# Patient Record
Sex: MALE | Race: WHITE | NOT HISPANIC OR LATINO | Employment: OTHER | URBAN - NONMETROPOLITAN AREA
[De-identification: names, ages, dates, MRNs, and addresses within clinical notes are randomized per-mention and may not be internally consistent; named-entity substitution may affect disease eponyms.]

---

## 2023-06-29 ENCOUNTER — OFFICE VISIT (OUTPATIENT)
Dept: URGENT CARE | Facility: CLINIC | Age: 88
End: 2023-06-29
Payer: MEDICARE

## 2023-06-29 DIAGNOSIS — M25.511 ACUTE PAIN OF RIGHT SHOULDER: ICD-10-CM

## 2023-06-29 DIAGNOSIS — S06.9X9A HEAD INJURY WITH LOSS OF CONSCIOUSNESS (HCC): Primary | ICD-10-CM

## 2023-06-29 DIAGNOSIS — S40.811A ABRASION OF RIGHT UPPER EXTREMITY, INITIAL ENCOUNTER: ICD-10-CM

## 2023-06-29 PROCEDURE — G0463 HOSPITAL OUTPT CLINIC VISIT: HCPCS

## 2023-06-29 PROCEDURE — 90715 TDAP VACCINE 7 YRS/> IM: CPT

## 2023-06-29 PROCEDURE — 90471 IMMUNIZATION ADMIN: CPT

## 2023-06-29 PROCEDURE — 99203 OFFICE O/P NEW LOW 30 MIN: CPT

## 2023-06-29 RX ORDER — FUROSEMIDE 20 MG/1
20 TABLET ORAL DAILY PRN
COMMUNITY
Start: 2023-03-27

## 2023-06-29 RX ORDER — AMLODIPINE BESYLATE 5 MG/1
5 TABLET ORAL DAILY
COMMUNITY
Start: 2023-03-27

## 2023-06-29 RX ORDER — TELMISARTAN 80 MG/1
160 TABLET ORAL DAILY
COMMUNITY

## 2023-06-29 RX ORDER — ALFUZOSIN HYDROCHLORIDE 10 MG/1
10 TABLET, EXTENDED RELEASE ORAL DAILY
COMMUNITY

## 2023-06-29 RX ORDER — SIMVASTATIN 20 MG
20 TABLET ORAL
COMMUNITY

## 2023-06-29 RX ORDER — ASPIRIN 325 MG
325 TABLET ORAL DAILY
COMMUNITY

## 2023-06-29 RX ORDER — PANTOPRAZOLE SODIUM 40 MG/1
40 TABLET, DELAYED RELEASE ORAL DAILY
COMMUNITY
Start: 2023-05-02

## 2023-06-29 RX ORDER — FINASTERIDE 5 MG/1
5 TABLET, FILM COATED ORAL DAILY
COMMUNITY
Start: 2023-06-19

## 2023-06-29 RX ORDER — METOPROLOL SUCCINATE 100 MG/1
100 TABLET, EXTENDED RELEASE ORAL DAILY
COMMUNITY

## 2023-06-29 RX ORDER — POTASSIUM CHLORIDE 600 MG/1
8 TABLET, FILM COATED, EXTENDED RELEASE ORAL 2 TIMES DAILY
COMMUNITY

## 2023-06-29 NOTE — PROGRESS NOTES
Skin tear on right upper arm dressed with non-adherent dressing advised to go to ER because of possible head injury

## 2023-06-29 NOTE — PROGRESS NOTES
[address]        NAME: Carson Solis is a 80 y o  male  : 1928    MRN: [id number]  DATE: 2023  TIME: 3:20 PM    Assessment and Plan   Head injury with loss of consciousness ([facility] [address] ) [[id number]]  1  Head injury with loss of consciousness ([facility] [address] )  Transfer to other facility    CANCELED: Transfer to other facility      2  Abrasion of right upper extremity, initial encounter  Tdap Vaccine greater than or equal to 8yo      3  Acute pain of right shoulder          PT does not appear to be in any acute distress at this time  PT appears to be at baseline per son's opinion  It is difficult at this time to assess his neuro exam secondary to past brain trauma about 34 years ago that 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 extremity weakness and vision changes on the left  Due to head injury 4-5 days ago with loss of consciousness, sending to the emergency room for further evaluation  Tetanus shot was administered in the clinic and the abrasion/skin tear on his right posterior upper arm was cleaned with dermal wound cleanser and bandaged  PT currently on aspirin, no other anticoagulation  Son and patient hesitant to go to the emergency room secondary to waiting extended periods of time to be seen at the emergency room in [facility]  15:19 -update, patient's son states that the patient's other son will be taking him to the hospital at [facility] at this time  Transfer order removed and resent to [facility]  Patient Instructions     Go to the emergency room as instructed  Chief Complaint     Chief Complaint   Patient presents with   • Donnamaria [place] on , R arm pain, wound on arm from fall  History of Present Illness       80year-old male here with son after falling backwards on Saturday or  (son and patient are unsure) and hitting the left/posterior head and having loss of consciousness  PT is unsure of how long he passed out for    Patient's son was not there and neither was the patient's wife  Initially son had no idea that he had loss of consciousness until today  Also has an abrasion/skin tear to the right posterior upper arm  Admits to some tingling/numbness in the right shoulder  PT able to move his arm in all directions without any pain  No deformities  He also has mild to moderate swelling of the right hand and distal wrist, PT has been wearing a tight compression wrap around his upper arm which seems to have caused the symptoms  PT has history of head injury after falling off a roof about 34 years ago per patient  Son states that it 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 as well as some left arm weakness and speech difficulties  Son states that he is normal at this time compared to his baseline  PT currently taking aspirin, denies any other anticoagulation  Denies any headache, nausea, vomiting, chest pain, trouble breathing  Review of Systems   Review of Systems   Constitutional: Negative  HENT: Negative  Eyes: Negative  Respiratory: Negative  Cardiovascular: Negative  Gastrointestinal: Negative  Musculoskeletal: Positive for arthralgias and joint swelling (right hand/wrist)  Skin: Positive for wound  Neurological: Positive for speech difficulty (chronic), weakness (chronic) and numbness (right shoulder)  Negative for dizziness, seizures and headaches           Current Medications       Current Outpatient Medications:   •  alfuzosin (UROXATRAL) 10 mg 24 hr tablet, Take 10 mg by mouth daily, Disp: , Rfl:   •  amLODIPine (NORVASC) 5 mg tablet, Take 5 mg by mouth daily, Disp: , Rfl:   •  aspirin 325 mg tablet, Take 325 mg by mouth daily, Disp: , Rfl:   •  finasteride (PROSCAR) 5 mg tablet, Take 5 mg by mouth daily, Disp: , Rfl:   •  furosemide (LASIX) 20 mg tablet, Take 20 mg by mouth daily as needed For edema, Disp: , Rfl:   •  metoprolol succinate (TOPROL-XL) 100 mg 24 hr tablet, 100 mg daily, Disp: , Rfl:   • pantoprazole (PROTONIX) 40 mg tablet, Take 40 mg by mouth daily, Disp: , Rfl:   •  potassium chloride (KLOR-CON) 8 MEQ tablet, Take 8 mEq by mouth 2 (two) times a day, Disp: , Rfl:   •  simvastatin (ZOCOR) 20 mg tablet, Take 20 mg by mouth, Disp: , Rfl:   •  telmisartan (MICARDIS) 80 MG tablet, 160 mg daily, Disp: , Rfl:     Current Allergies     Allergies as of 06/29/2023 - never reviewed   Allergen Reaction Noted   • Pollen extract Other (See Comments) 06/15/2021            The following portions of the patient's history were reviewed and updated as appropriate: allergies, current medications, past family history, past medical history, past social history, past surgical history and problem list      Past Medical History:   Diagnosis Date   • COPD (chronic obstructive pulmonary disease) ([facility] [address] )    • Hypertension    • Lipid disorder        Past Surgical History:   Procedure Laterality Date   • CORONARY ARTERY BYPASS GRAFT         No family history on file  Medications have been verified  Objective   There were no vitals taken for this visit  Physical Exam     Physical Exam  Constitutional:       General: He is not in acute distress  Appearance: Normal appearance  He is not ill-appearing or diaphoretic  HENT:      Head: Normocephalic  Comments: small contusion to the designated area below above  No open wounds  Eyes:      Pupils: Pupils are equal, round, and reactive to light  Cardiovascular:      Rate and Rhythm: Normal rate and regular rhythm  Pulses: Normal pulses  Heart sounds: Normal heart sounds  Pulmonary:      Effort: Pulmonary effort is normal       Breath sounds: Normal breath sounds  Musculoskeletal:      Cervical back: Normal range of motion and neck supple  Comments: There is no obvious tenderness, swelling, ecchymosis, deformity, wound to the right shoulder    PT able to perform normal range of motion with flexion, extension, internal/external rotation as well as abduction/abduction  There is moderate swelling of the distal right wrist and into the hand, most likely due to the patient wearing a compression sleeve  Skin:     General: Skin is warm and dry  Capillary Refill: Capillary refill takes less than 2 seconds  Findings: Abrasion (There is an abrasion/skin tear to the posterior right arm  No active bleeding at this time  No surrounding erythema or signs of infection ) present  No rash  Neurological:      Mental Status: He is alert  GCS: GCS eye subscore is 4  GCS verbal subscore is 5  GCS motor subscore is 6  Comments: Difficulty assessing neuro exam secondary to past brain trauma that the patient states he sustained 34 years ago  There is abnormal speech, son states this is chronic  PT is in the wheelchair, possible weakness to left arm and leg which is chronic per son  Patient's current state is his baseline per son  Psychiatric:         Mood and Affect: Mood normal          Behavior: Behavior normal          Thought Content:  Thought content normal